# Patient Record
Sex: FEMALE | Race: WHITE | URBAN - METROPOLITAN AREA
[De-identification: names, ages, dates, MRNs, and addresses within clinical notes are randomized per-mention and may not be internally consistent; named-entity substitution may affect disease eponyms.]

---

## 2018-11-19 ENCOUNTER — OFFICE VISIT (OUTPATIENT)
Dept: OBGYN | Facility: CLINIC | Age: 63
End: 2018-11-19
Payer: COMMERCIAL

## 2018-11-19 ENCOUNTER — TRANSFERRED RECORDS (OUTPATIENT)
Dept: HEALTH INFORMATION MANAGEMENT | Facility: CLINIC | Age: 63
End: 2018-11-19

## 2018-11-19 VITALS
HEIGHT: 63 IN | BODY MASS INDEX: 37.74 KG/M2 | SYSTOLIC BLOOD PRESSURE: 152 MMHG | DIASTOLIC BLOOD PRESSURE: 68 MMHG | WEIGHT: 213 LBS

## 2018-11-19 DIAGNOSIS — Z01.419 ENCOUNTER FOR GYNECOLOGICAL EXAMINATION WITHOUT ABNORMAL FINDING: Primary | ICD-10-CM

## 2018-11-19 DIAGNOSIS — N95.1 SYMPTOMATIC MENOPAUSAL OR FEMALE CLIMACTERIC STATES: ICD-10-CM

## 2018-11-19 PROCEDURE — 99396 PREV VISIT EST AGE 40-64: CPT | Performed by: OBSTETRICS & GYNECOLOGY

## 2018-11-19 PROCEDURE — G0145 SCR C/V CYTO,THINLAYER,RESCR: HCPCS | Performed by: OBSTETRICS & GYNECOLOGY

## 2018-11-19 PROCEDURE — 87624 HPV HI-RISK TYP POOLED RSLT: CPT | Performed by: OBSTETRICS & GYNECOLOGY

## 2018-11-19 NOTE — LETTER
November 29, 2018    Saarh Galeano  [address] [facility] [address]    Dear Sarah,  We are happy to inform you that your PAP smear result from 11/19/18 is normal.  We are now able to do a follow up test on PAP smears. The DNA test is for HPV (Human Papilloma Virus). Cervical cancer is closely linked with certain types of HPV. Your results showed no evidence of high risk HPV.  Therefore we recommend you return in 5 years for your next pap smear and HPV test.  You will still need to return to the clinic every year for an annual exam and other preventive tests.  If you have additional questions regarding this result, please call our registered nurse, Sara at [phone].  Sincerely,    Benoit Talley MD/mary

## 2018-11-19 NOTE — MR AVS SNAPSHOT
"              After Visit Summary   11/19/2018    Sarah Galeano    MRN: [id number]           Patient Information     Date Of Birth          1955        Visit Information        Provider Department      11/19/2018 3:15 PM Benoit Talley MD [facility]        Today's Diagnoses     Encounter for gynecological examination without abnormal finding    -  1    Symptomatic menopausal or female climacteric states           Follow-ups after your visit        Who to contact     If you have questions or need follow up information about today's clinic visit or your schedule please contact [facility] KATHLEEN directly at [phone].  Normal or non-critical lab and imaging results will be communicated to you by MyChart, letter or phone within 4 business days after the clinic has received the results. If you do not hear from us within 7 days, please contact the clinic through MyChart or phone. If you have a critical or abnormal lab result, we will notify you by phone as soon as possible.  Submit refill requests through [employer] or call your pharmacy and they will forward the refill request to us. Please allow 3 business days for your refill to be completed.          Additional Information About Your Visit        Care EveryWhere ID     This is your Care EveryWhere ID. This could be used by other organizations to access your [facility] medical records  [id number]        Your Vitals Were     Height Breastfeeding? BMI (Body Mass Index)             5' 3\" (1.6 m) No 37.73 kg/m2          Blood Pressure from Last 3 Encounters:   11/19/18 152/68   10/17/16 146/74   10/13/15 128/80    Weight from Last 3 Encounters:   11/19/18 213 lb (96.6 kg)   10/17/16 192 lb (87.1 kg)   10/13/15 218 lb (98.9 kg)              We Performed the Following     HPV High Risk Types DNA Cervical     Pap imaged thin layer screen with HPV - recommended age 30 - 65        Primary Care Provider Office Phone # Fax #    Juan David " Nikos [phone]       [facility] [address] [place] [address]        Equal Access to Services     JAKE SESAY : Buffy melinda sainz janine Amaro, wafredda luqleigh, qaaniyata kaalmada joy, susanna cortez gennaryder woods laMaddieswathi rodriguez. So [facility] [phone].    ATENCIÓN: Si habla español, tiene a [place] disposición servicios gratuitos de asistencia lingüística. Llame al [phone].    We comply with applicable federal civil rights laws and [place] laws. We do not discriminate on the basis of race, color, national origin, age, disability, sex, sexual orientation, or gender identity.            Thank you!     Thank you for choosing [facility]  for your care. Our goal is always to provide you with excellent care. Hearing back from our patients is one way we can continue to improve our services. Please take a few minutes to complete the written survey that you may receive in the mail after your visit with us. Thank you!             Your Updated Medication List - Protect others around you: Learn how to safely use, store and throw away your medicines at www.disposemymeds.org.          This list is accurate as of 11/19/18  3:21 PM.  Always use your most recent med list.                   Brand Name Dispense Instructions for use Diagnosis    ascorbic acid 1000 MG Tabs    vitamin C     Take 1,000 mg by mouth daily.        aspirin 81 MG tablet      Take 162 mg by mouth daily        BIOTIN PO      Take  by mouth.        drospirenone-estradiol 0.5-1 MG tablet    ANGELIQ    90 tablet    Take 1 tablet by mouth daily    Symptomatic menopausal or female climacteric states       flax seed oil 1000 MG capsule      Take 1 capsule by mouth daily        ibuprofen 200 MG tablet    ADVIL/MOTRIN     Take by mouth every morning        PRILOSEC OTC PO      Take 20 mg by mouth every evening        VITAMIN B COMPLEX PO      Take  by mouth.        VITAMIN D PO      Take  by mouth.         vitamin E 400 units Tabs      Take 400 Units by mouth daily.        WOMENS 50+ MULTI VITAMIN/MIN Tabs      Take  by mouth.

## 2018-11-19 NOTE — PROGRESS NOTES
Sarah is a 63 year old  female who presents for annual exam.     Besides routine health maintenance, she has no other health concerns today .    HPI: The patient is seen at this time for her annual exam.  Her health is unchanged but her  now has pancreatic cancer and has been going through many different surgeries and therapies.  This obviously has been very stressful to her.  The patient's PCP is  SERGEY PEREZ.        GYNECOLOGIC HISTORY:    No LMP recorded. Patient is postmenopausal.  Her current contraception method is: menopause.  She  reports that she has never smoked. She has never used smokeless tobacco.    Patient is sexually active.  STD testing offered?  Declined  Last PHQ-9 score on record =   PHQ-9 SCORE 10/17/2016   Total Score 0     Last GAD7 score on record =   MARIA-7 SCORE 10/17/2016   Total Score 0     Alcohol Score = 5    HEALTH MAINTENANCE:  Cholesterol:   Cholesterol   Date Value Ref Range Status   2008 276@ 115 - 199 mg/dL       Last Mammo: 10/17/16, Result: normal, Next Mammo: today   Pap:   Lab Results   Component Value Date    PAP NIL 10/17/2016    PAP NIL 10/13/2015     Colonoscopy:  , Result: normal, Next Colonoscopy: 10 years.  Dexa:  10/2015    Health maintenance updated:  yes    HISTORY:  Obstetric History       T3      L3     SAB0   TAB0   Ectopic0   Multiple0   Live Births0       # Outcome Date GA Lbr Nicho/2nd Weight Sex Delivery Anes PTL Lv   3 Term            2 Term            1 Term                   There is no problem list on file for this patient.    Past Surgical History:   Procedure Laterality Date     CHOLECYSTECTOMY, LAPOROSCOPIC       COLONOSCOPY       COLONOSCOPY      Negative. repeat 10 years     TONSILLECTOMY & ADENOIDECTOMY  1971     TUBAL LIGATION        Social History   Substance Use Topics     Smoking status: Never Smoker     Smokeless tobacco: Never Used      Comment: no 2nd hand smoke exposure     Alcohol use  "0.0 oz/week     0 Standard drinks or equivalent per week      Comment: weekly      Problem (# of Occurrences) Relation (Name,Age of Onset)    Hypertension (1) Mother    Unknown/Adopted (2) Mother, Father            Current Outpatient Prescriptions   Medication Sig     ascorbic acid (VITAMIN C) 1000 MG TABS Take 1,000 mg by mouth daily.     aspirin 81 MG tablet Take 162 mg by mouth daily      B Complex Vitamins (VITAMIN B COMPLEX PO) Take  by mouth.     BIOTIN PO Take  by mouth.     Cholecalciferol (VITAMIN D PO) Take  by mouth.     Flaxseed, Linseed, (FLAX SEED OIL) 1000 MG capsule Take 1 capsule by mouth daily     ibuprofen (ADVIL,MOTRIN) 200 MG tablet Take by mouth every morning      Multiple Vitamins-Minerals (WOMENS 50+ MULTI VITAMIN/MIN) TABS Take  by mouth.     Omeprazole Magnesium (PRILOSEC OTC PO) Take 20 mg by mouth every evening     vitamin E 400 UNIT TABS Take 400 Units by mouth daily.     Drospirenone-Estradiol (ANGELIQ) 0.5-1 MG TABS Take 1 tablet by mouth daily (Patient not taking: Reported on 11/19/2018)     No current facility-administered medications for this visit.      No Known Allergies    Past medical, surgical, social and family histories were reviewed and updated in EPIC.    ROS:   12 point review of systems negative other than symptoms noted below.    EXAM:  /68  Ht 5' 3\" (1.6 m)  Wt 213 lb (96.6 kg)  Breastfeeding? No  BMI 37.73 kg/m2   BMI: Body mass index is 37.73 kg/(m^2).    PHYSICAL EXAM:  Constitutional:  Appearance: Well nourished, well developed, alert, in no acute distress  Neck:  Lymph Nodes:  No lymphadenopathy present    Thyroid:  Gland size normal, nontender, no nodules or masses present  on palpation  Chest:  Respiratory Effort:  Breathing unlabored  Cardiovascular:    Heart: Auscultation:  Regular rate, normal rhythm, no murmurs present  Breasts: Inspection of Breasts:  No lymphadenopathy present., Palpation of Breasts and Axillae:  No masses present on palpation, no " breast tenderness., Axillary Lymph Nodes:  No lymphadenopathy present. and No nodularity, asymmetry or nipple discharge bilaterally.  Gastrointestinal:   Abdominal Examination:  Abdomen nontender to palpation, tone normal without rigidity or guarding, no masses present, umbilicus without lesions   Liver and Spleen:  No hepatomegaly present, liver nontender to palpation    Hernias:  No hernias present  Lymphatic: Lymph Nodes:  No other lymphadenopathy present  Skin:  General Inspection:  No rashes present, no lesions present, no areas of  discoloration    Genitalia and Groin:  No rashes present, no lesions present, no areas of  discoloration, no masses present  Neurologic/Psychiatric:    Mental Status:  Oriented X3     Pelvic Exam:  External Genitalia:     Normal appearance for age, no discharge present, no tenderness present, no inflammatory lesions present, color normal  Vagina:     Normal vaginal vault without central or paravaginal defects, ATROPHIC  Bladder:     Nontender to palpation  Urethra:   Urethral Body:  Urethra palpation normal, urethra structural support normal   Urethral Meatus:  No erythema or lesions present  Cervix:     Appearance healthy, no lesions present, nontender to palpation, no bleeding present  Uterus:     Nontender to palpation, no masses present, position anteflexed, mobility: normal  Adnexa:     No adnexal tenderness present, no adnexal masses present  Perineum:     Perineum within normal limits, no evidence of trauma, no rashes or skin lesions present  Inguinal Lymph Nodes:     No lymphadenopathy present      COUNSELING:   Reviewed preventive health counseling, as reflected in patient instructions       Regular exercise       Healthy diet/nutrition    BMI: Body mass index is 37.73 kg/(m^2).  Weight management plan: Discussed healthy diet and exercise guidelines and patient will follow up in 12 months in clinic to re-evaluate.    ASSESSMENT:  63 year old female with satisfactory annual  exam.    ICD-10-CM    1. Encounter for gynecological examination without abnormal finding Z01.419 Pap imaged thin layer screen with HPV - recommended age 30 - 65     HPV High Risk Types DNA Cervical   2. Symptomatic menopausal or female climacteric states N95.1        PLAN: 63-year-old patient who continues to struggle with her weight.  She is under a tremendous amount of stress with her 's health at this time.  She will be transferring all of her care to an internist close to her home.      Benoit Talley MD

## 2018-11-21 LAB
COPATH REPORT: NORMAL
PAP: NORMAL

## 2018-11-23 LAB
FINAL DIAGNOSIS: NORMAL
HPV HR 12 DNA CVX QL NAA+PROBE: NEGATIVE
HPV16 DNA SPEC QL NAA+PROBE: NEGATIVE
HPV18 DNA SPEC QL NAA+PROBE: NEGATIVE
SPECIMEN DESCRIPTION: NORMAL
SPECIMEN SOURCE CVX/VAG CYTO: NORMAL

## 2018-11-29 PROBLEM — Z12.4 SCREENING FOR CERVICAL CANCER: Status: ACTIVE | Noted: 2018-11-29
